# Patient Record
Sex: FEMALE | Race: WHITE
[De-identification: names, ages, dates, MRNs, and addresses within clinical notes are randomized per-mention and may not be internally consistent; named-entity substitution may affect disease eponyms.]

---

## 2019-06-29 ENCOUNTER — HOSPITAL ENCOUNTER (INPATIENT)
Dept: HOSPITAL 10 - TEL | Age: 64
LOS: 2 days | Discharge: HOME | DRG: 690 | End: 2019-07-01
Attending: INTERNAL MEDICINE | Admitting: INTERNAL MEDICINE
Payer: COMMERCIAL

## 2019-06-29 ENCOUNTER — HOSPITAL ENCOUNTER (INPATIENT)
Dept: HOSPITAL 91 - TEL | Age: 64
LOS: 2 days | Discharge: HOME | DRG: 690 | End: 2019-07-01
Payer: COMMERCIAL

## 2019-06-29 VITALS
BODY MASS INDEX: 30.89 KG/M2 | BODY MASS INDEX: 30.89 KG/M2 | HEIGHT: 65 IN | WEIGHT: 185.41 LBS | HEIGHT: 65 IN | WEIGHT: 185.41 LBS

## 2019-06-29 DIAGNOSIS — E66.9: ICD-10-CM

## 2019-06-29 DIAGNOSIS — F41.9: ICD-10-CM

## 2019-06-29 DIAGNOSIS — E11.9: ICD-10-CM

## 2019-06-29 DIAGNOSIS — Z90.49: ICD-10-CM

## 2019-06-29 DIAGNOSIS — E78.5: ICD-10-CM

## 2019-06-29 DIAGNOSIS — Z79.4: ICD-10-CM

## 2019-06-29 DIAGNOSIS — R22.1: ICD-10-CM

## 2019-06-29 DIAGNOSIS — Z79.82: ICD-10-CM

## 2019-06-29 DIAGNOSIS — G43.909: ICD-10-CM

## 2019-06-29 DIAGNOSIS — I10: ICD-10-CM

## 2019-06-29 DIAGNOSIS — N39.0: Primary | ICD-10-CM

## 2019-06-29 DIAGNOSIS — M79.7: ICD-10-CM

## 2019-06-29 PROCEDURE — 87086 URINE CULTURE/COLONY COUNT: CPT

## 2019-06-29 PROCEDURE — 76536 US EXAM OF HEAD AND NECK: CPT

## 2019-06-29 PROCEDURE — 82962 GLUCOSE BLOOD TEST: CPT

## 2019-06-29 PROCEDURE — 85025 COMPLETE CBC W/AUTO DIFF WBC: CPT

## 2019-06-29 PROCEDURE — 80048 BASIC METABOLIC PNL TOTAL CA: CPT

## 2019-06-29 PROCEDURE — 81003 URINALYSIS AUTO W/O SCOPE: CPT

## 2019-06-29 PROCEDURE — 83036 HEMOGLOBIN GLYCOSYLATED A1C: CPT

## 2019-06-29 PROCEDURE — A4310 INSERT TRAY W/O BAG/CATH: HCPCS

## 2019-06-30 VITALS — RESPIRATION RATE: 18 BRPM | DIASTOLIC BLOOD PRESSURE: 76 MMHG | SYSTOLIC BLOOD PRESSURE: 158 MMHG | HEART RATE: 80 BPM

## 2019-06-30 VITALS — HEART RATE: 72 BPM | RESPIRATION RATE: 18 BRPM | SYSTOLIC BLOOD PRESSURE: 125 MMHG | DIASTOLIC BLOOD PRESSURE: 62 MMHG

## 2019-06-30 VITALS — SYSTOLIC BLOOD PRESSURE: 120 MMHG | HEART RATE: 79 BPM | DIASTOLIC BLOOD PRESSURE: 68 MMHG | RESPIRATION RATE: 18 BRPM

## 2019-06-30 VITALS — SYSTOLIC BLOOD PRESSURE: 117 MMHG | HEART RATE: 69 BPM | RESPIRATION RATE: 18 BRPM | DIASTOLIC BLOOD PRESSURE: 65 MMHG

## 2019-06-30 VITALS — HEART RATE: 74 BPM | DIASTOLIC BLOOD PRESSURE: 60 MMHG | RESPIRATION RATE: 18 BRPM | SYSTOLIC BLOOD PRESSURE: 138 MMHG

## 2019-06-30 VITALS — HEART RATE: 74 BPM | DIASTOLIC BLOOD PRESSURE: 67 MMHG | RESPIRATION RATE: 18 BRPM | SYSTOLIC BLOOD PRESSURE: 127 MMHG

## 2019-06-30 VITALS — HEART RATE: 73 BPM | RESPIRATION RATE: 18 BRPM | DIASTOLIC BLOOD PRESSURE: 62 MMHG | SYSTOLIC BLOOD PRESSURE: 121 MMHG

## 2019-06-30 LAB
ADD MAN DIFF?: NO
ADD UMIC: NO
ANION GAP: 9 (ref 5–13)
BASOPHIL #: 0.1 10^3/UL (ref 0–0.1)
BASOPHILS %: 1.5 % (ref 0–2)
BLOOD UREA NITROGEN: 11 MG/DL (ref 7–20)
CALCIUM: 8.7 MG/DL (ref 8.4–10.2)
CARBON DIOXIDE: 25 MMOL/L (ref 21–31)
CHLORIDE: 109 MMOL/L (ref 97–110)
CREATININE: 0.51 MG/DL (ref 0.44–1)
EOSINOPHILS #: 0.3 10^3/UL (ref 0–0.5)
EOSINOPHILS %: 5.2 % (ref 0–7)
GLUCOSE: 118 MG/DL (ref 70–220)
HEMATOCRIT: 38.1 % (ref 37–47)
HEMOGLOBIN: 13.1 G/DL (ref 12–16)
IMMATURE GRANS #M: 0.02 10^3/UL (ref 0–0.03)
IMMATURE GRANS % (M): 0.3 % (ref 0–0.43)
LYMPHOCYTES #: 2.5 10^3/UL (ref 0.8–2.9)
LYMPHOCYTES %: 39.7 % (ref 15–51)
MEAN CORPUSCULAR HEMOGLOBIN: 30.3 PG (ref 29–33)
MEAN CORPUSCULAR HGB CONC: 34.4 G/DL (ref 32–37)
MEAN CORPUSCULAR VOLUME: 88.2 FL (ref 82–101)
MEAN PLATELET VOLUME: 11 FL (ref 7.4–10.4)
MONOCYTE #: 0.5 10^3/UL (ref 0.3–0.9)
MONOCYTES %: 7.8 % (ref 0–11)
NEUTROPHIL #: 2.8 10^3/UL (ref 1.6–7.5)
NEUTROPHILS %: 45.5 % (ref 39–77)
NUCLEATED RED BLOOD CELLS #: 0 10^3/UL (ref 0–0)
NUCLEATED RED BLOOD CELLS%: 0 /100WBC (ref 0–0)
PLATELET COUNT: 263 10^3/UL (ref 140–415)
POTASSIUM: 4.1 MMOL/L (ref 3.5–5.1)
RED BLOOD COUNT: 4.32 10^6/UL (ref 4.2–5.4)
RED CELL DISTRIBUTION WIDTH: 12.6 % (ref 11.5–14.5)
SODIUM: 143 MMOL/L (ref 135–144)
UR ASCORBIC ACID: NEGATIVE MG/DL
UR BILIRUBIN (DIP): NEGATIVE MG/DL
UR BLOOD (DIP): NEGATIVE MG/DL
UR CLARITY: CLEAR
UR COLOR: YELLOW
UR GLUCOSE (DIP): NEGATIVE MG/DL
UR KETONES (DIP): NEGATIVE MG/DL
UR LEUKOCYTE ESTERASE (DIP): NEGATIVE LEU/UL
UR NITRITE (DIP): NEGATIVE MG/DL
UR PH (DIP): 5 (ref 5–9)
UR SPECIFIC GRAVITY (DIP): 1.01 (ref 1–1.03)
UR TOTAL PROTEIN (DIP): NEGATIVE MG/DL
UR UROBILINOGEN (DIP): NEGATIVE MG/DL
WHITE BLOOD COUNT: 6.2 10^3/UL (ref 4.8–10.8)

## 2019-06-30 RX ADMIN — FOLIC ACID SCH MLS/HR: 5 INJECTION, SOLUTION INTRAMUSCULAR; INTRAVENOUS; SUBCUTANEOUS at 18:03

## 2019-06-30 RX ADMIN — ATORVASTATIN CALCIUM 1 MG: 40 TABLET, FILM COATED ORAL at 20:39

## 2019-06-30 RX ADMIN — THIAMINE HYDROCHLORIDE 1 MLS/HR: 100 INJECTION, SOLUTION INTRAMUSCULAR; INTRAVENOUS at 22:30

## 2019-06-30 RX ADMIN — ALPRAZOLAM 1 MG: 0.25 TABLET ORAL at 08:21

## 2019-06-30 RX ADMIN — ACETAMINOPHEN 1 MG: 325 TABLET, FILM COATED ORAL at 10:36

## 2019-06-30 RX ADMIN — FOLIC ACID SCH MLS/HR: 5 INJECTION, SOLUTION INTRAMUSCULAR; INTRAVENOUS; SUBCUTANEOUS at 02:50

## 2019-06-30 RX ADMIN — INSULIN ASPART 1 UNIT: 100 INJECTION, SOLUTION INTRAVENOUS; SUBCUTANEOUS at 12:23

## 2019-06-30 RX ADMIN — FOLIC ACID SCH MLS/HR: 5 INJECTION, SOLUTION INTRAMUSCULAR; INTRAVENOUS; SUBCUTANEOUS at 22:30

## 2019-06-30 RX ADMIN — ALPRAZOLAM 1 MG: 0.25 TABLET ORAL at 20:35

## 2019-06-30 RX ADMIN — CEFEPIME SCH MLS/HR: 1 INJECTION, POWDER, FOR SOLUTION INTRAMUSCULAR; INTRAVENOUS at 08:19

## 2019-06-30 RX ADMIN — LORATADINE SCH MG: 10 TABLET ORAL at 06:53

## 2019-06-30 RX ADMIN — LORATADINE 1 MG: 10 TABLET ORAL at 06:53

## 2019-06-30 RX ADMIN — INSULIN ASPART 1 UNIT: 100 INJECTION, SOLUTION INTRAVENOUS; SUBCUTANEOUS at 07:55

## 2019-06-30 RX ADMIN — ACETAMINOPHEN 1 MG: 325 TABLET, FILM COATED ORAL at 20:35

## 2019-06-30 RX ADMIN — CEFEPIME 1 MLS/HR: 1 INJECTION, POWDER, FOR SOLUTION INTRAMUSCULAR; INTRAVENOUS at 08:19

## 2019-06-30 RX ADMIN — AMLODIPINE BESYLATE 1 MG: 2.5 TABLET ORAL at 06:54

## 2019-06-30 RX ADMIN — THIAMINE HYDROCHLORIDE 1 MLS/HR: 100 INJECTION, SOLUTION INTRAMUSCULAR; INTRAVENOUS at 18:03

## 2019-06-30 RX ADMIN — CEFEPIME SCH MLS/HR: 1 INJECTION, POWDER, FOR SOLUTION INTRAMUSCULAR; INTRAVENOUS at 20:37

## 2019-06-30 RX ADMIN — LOSARTAN POTASSIUM 1 MG: 50 TABLET ORAL at 06:54

## 2019-06-30 RX ADMIN — INSULIN ASPART 1 UNIT: 100 INJECTION, SOLUTION INTRAVENOUS; SUBCUTANEOUS at 17:27

## 2019-06-30 RX ADMIN — CEFEPIME 1 MLS/HR: 1 INJECTION, POWDER, FOR SOLUTION INTRAMUSCULAR; INTRAVENOUS at 20:37

## 2019-06-30 RX ADMIN — ASPIRIN 1 MG: 81 TABLET, COATED ORAL at 08:19

## 2019-06-30 RX ADMIN — MELATONIN TAB 3 MG 1 MG: 3 TAB at 20:36

## 2019-06-30 RX ADMIN — ASPIRIN SCH MG: 81 TABLET, COATED ORAL at 08:19

## 2019-06-30 RX ADMIN — THIAMINE HYDROCHLORIDE 1 MLS/HR: 100 INJECTION, SOLUTION INTRAMUSCULAR; INTRAVENOUS at 02:50

## 2019-06-30 RX ADMIN — LOSARTAN POTASSIUM SCH MG: 50 TABLET, FILM COATED ORAL at 06:54

## 2019-06-30 NOTE — CONS
Assessment/Plan


Assessment/Plan


Hospital Course (Demo Recall)


64-year-old female presented to [facility] with the 


suprapubic pain going all the way to the rectum.  Patient was recently treated 


for urinary tract infection and last antibiotic was around June 18, 2019.  She 


has had recurrent urinary tract infection.  Patient was transferred to [facility] because of her insurance.  She did have a CT scan of the 


abdomen and pelvis at [facility] and that showed: Tiny 


amount of air within the bladder etiology is unclear lung bases demonstrate 


clear lung without any pleural effusion or pneumothorax.  The heart is 


unremarkable.  There is no pericardial effusion.  Study of the internal organs 


is limited due to lack of IV contrast.  Liver, spleen, adrenal glands, pancreas,


kidneys are all normal in appearance.  The patient is status post 


cholecystectomy.  There is no retroperitoneal or pelvic lymphadenopathy.  There 


is no free intra-abdominal air or fluid.  Pelvic viscera otherwise normal in 


appearance.  There is again sigmoid diverticulosis bowel gas pattern is 


otherwise unremarkable.  There is no appendicitis or diverticulitis.  There is 


no renal stone or ureteral stone.  There is again minimal vascular 


calcification.  Tiny amount of air is again noted within the bladder etiology is


unclear.


On the physical exam there is no abdominal mass palpable.  There is no flank 


tenderness.  She has mild suprapubic tenderness.  Pelvic exam did not show any 


mass or bleeding.


Impression: Patient may have urinary tract infection.  The air in the bladder 


could be because of the infection or from prior catheterization.  Plan: Do 


straight cath for urine culture and sensitivity and continue the antibiotics.





Consultation Date/Type/Reason


Admit Date/Time


Jun 29, 2019 at 23:47


Date of Consultation:  Jun 30, 2019


Type of Consult


Urology


Reason for Consultation


Suprapubic pain, air in the bladder on the CT scan


Requesting Provider:  EMELIA WRAY MD


Date/Time of Note


DATE: 6/30/19 


TIME: 20:12





Hx of Present Illness


64-year-old female presented to [facility] with the 


suprapubic pain going all the way to the rectum.  Patient was recently treated 


for urinary tract infection and last antibiotic was around June 18, 2019.  She 


has had recurrent urinary tract infection.  Patient was transferred to [facility] because of her insurance.  She did have a CT scan of the 


abdomen and pelvis at [facility] and that showed: Tiny 


amount of air within the bladder etiology is unclear lung bases demonstrate 


clear lung without any pleural effusion or pneumothorax.  The heart is 


unremarkable.  There is no pericardial effusion.  Study of the internal organs 


is limited due to lack of IV contrast.  Liver, spleen, adrenal glands, pancreas,


kidneys are all normal in appearance.  The patient is status post 


cholecystectomy.  There is no retroperitoneal or pelvic lymphadenopathy.  There 


is no free intra-abdominal air or fluid.  Pelvic viscera otherwise normal in 


appearance.  There is again sigmoid diverticulosis bowel gas pattern is 


otherwise unremarkable.  There is no appendicitis or diverticulitis.  There is 


no renal stone or ureteral stone.  There is again minimal vascular 


calcification.  Tiny amount of air is again noted within the bladder etiology is


unclear.


Constitutional:  chills (Prior to admission)


Eyes:  no complaints


ENT:  no complaints


Respiratory:  No shortness of breath


Cardiovascular:  No chest pain


Gastrointestinal:  nausea, vomiting (Phlegm)


Genitourinary:  dysuria; 


   No flank pain


Musculoskeletal:  no complaints


Skin:  no complaints


Neurologic:  no complaints, other (Migraine headache)


Endocrine:  no complaints


Lymphatic:  adenopathy (Left side of neck)


Psychological:  no complaints


Immunologic:  no complaints





Past Medical History


Medical History:  diabetes, high cholesterol, hypertension, other (Migraine 


headaches and fibromyalgia)


Home Meds


Reported Medications


Gabapentin* (Gabapentin*) 300 Mg Capsule, 300 MG PO DAILY, #60 CAP


   9/6/16


Atorvastatin* (Atorvastatin*) 40 Mg Tablet, 20 MG PO QHS, #30 TAB


   9/6/16


Dicyclomine Hcl* (Bentyl*) 20 Mg Tablet, 20 MG PO QID, TAB


   9/6/16


Calcium Carbonate-Vitamin D3 (Calcium 600 + D Softgel) 1 Each Capsule, 1 CAP PO 


DAILY, CAP


   9/6/16


Aspirin Ec (Aspir 81) 81 Mg Tablet.dr, 81 MG PO DAILY, #30 TAB


   9/6/16


Hydroxyzine Hcl (HYDROXYZINE HCL) 10 Mg/5 Ml Syrup, 25 MG PO Q6 PRN for 


ALLERGIES, #480 ML


   9/6/16


Alprazolam* (Alprazolam*) 0.25 Mg Tablet, 0.25 MG PO DAILY, TAB


   9/6/16


Metformin Hcl* (Metformin Hcl*) 500 Mg Tablet, 500 MG PO WITH BREAKFAST, #30 TAB


   9/6/16


Amlodipine Besylate* (Amlodipine Besylate*) 2.5 Mg Tablet, 5 MG PO DAILY, #30 


TAB


   9/6/16


Losartan Potassium* (Losartan Potassium*) 100 Mg Tablet, 100 MG PO DAILY, TAB


   9/6/16


Medications





Current Medications


Sodium Chloride 1,000 ml @  100 mls/hr Q10H IV  Last administered on 6/30/19at 


18:03; Admin Dose 100 MLS/HR;  Start 6/30/19 at 02:30


Acetaminophen (Tylenol Tab) 650 mg Q4H  PRN PO MILD PAIN(1-3)OR ELEVATED TEMP 


Last administered on 6/30/19at 10:36; Admin Dose 650 MG;  Start 6/30/19 at 02:30


Ondansetron HCl (Zofran Inj) 4 mg Q6H  PRN IV NAUSEA AND/OR VOMITING;  Start 


6/30/19 at 02:30


Cefepime HCl 50 ml @  100 mls/hr Q12 IVPB  Last administered on 6/30/19at 08:19;


Admin Dose 100 MLS/HR;  Start 6/30/19 at 09:00


Insulin Aspart (Novolog Insulin Pen) 4 unit WITH  MEALS SC ;  Start 6/30/19 at 


07:55


Diagnostic Test (Pha) (Accu-Chek) 1 ea AC MEALS AND  BEDTIME XX  Last 


administered on 6/30/19at 17:27; Admin Dose 1 EA;  Start 6/30/19 at 07:25


Miscellaneous Information 1 ea NOTE XX ;  Start 6/30/19 at 02:30


Glucose (Glutose) 15 gm Q15M  PRN PO DECREASED GLUCOSE;  Start 6/30/19 at 02:30


Glucose (Glutose) 22.5 gm Q15M  PRN PO DECREASED GLUCOSE;  Start 6/30/19 at 


02:30


Dextrose (D50w Syringe) 25 ml Q15M  PRN IV DECREASED GLUCOSE;  Start 6/30/19 at 


02:30


Dextrose (D50w Syringe) 50 ml Q15M  PRN IV DECREASED GLUCOSE;  Start 6/30/19 at 


02:30


Glucagon (Glucagen) 1 mg Q15M  PRN IM DECREASED GLUCOSE;  Start 6/30/19 at 02:30


Glucose (Glutose) 15 gm Q15M  PRN BUCCAL DECREASED GLUCOSE;  Start 6/30/19 at 


02:30


Amlodipine Besylate (Norvasc) 5 mg DAILY@0600 PO  Last administered on 6/30/19at


06:54; Admin Dose 5 MG;  Start 6/30/19 at 06:00


Aspirin (Halfprin) 81 mg DAILY PO  Last administered on 6/30/19at 08:19; Admin 


Dose 81 MG;  Start 6/30/19 at 09:00


Atorvastatin Calcium (Lipitor) 20 mg QHS PO ;  Start 6/30/19 at 21:00


Losartan Potassium (Cozaar) 100 mg DAILY@0600 PO  Last administered on 6/30/19at


06:54; Admin Dose 100 MG;  Start 6/30/19 at 06:00


Non-Formulary Medication 1 ea PRN  PRN BOTH EYES EYE IRRITATION;  Start 6/30/19 


at 02:30;  Status UNV


Loratadine (Claritin) 10 mg DAILY@0600 PO  Last administered on 6/30/19at 06:53;


Admin Dose 10 MG;  Start 6/30/19 at 06:00


Promethazine HCl/ Dextromethorphan (Phenergan-Dm) 5 ml Q4H  PRN PO COUGH;  Start


6/30/19 at 02:30


Meloxicam (Mobic) 7.5 mg PRN  PRN PO INFLAMATION;  Start 6/30/19 at 02:30


Non-Formulary Medication 1 ea BID TOP ;  Start 6/30/19 at 09:00


Miscellaneous Information 1 ea NOTE XX ;  Start 6/30/19 at 03:30


Alprazolam (Xanax) 0.25 mg QHS PO ;  Start 6/30/19 at 21:00


Melatonin (Melatonin) 3 mg HS PO ;  Start 6/30/19 at 21:00


Metformin HCl (Glucophage) 500 mg BID WITH  MEALS PO  Last administered on 


6/30/19at 17:31; Admin Dose 500 MG;  Start 6/30/19 at 17:55


Pantoprazole (Protonix Tab) 40 mg DAILY@06 PO ;  Start 7/1/19 at 06:00


Sumatriptan Succinate (Imitrex) 25 mg Q8  PRN PO HEADACHE;  Start 6/30/19 at 


12:30


Allergies:  


Coded Allergies:  


     shrimp (Verified  Allergy, Unknown, 6/30/19)





Past Surgical History


Past Surgical Hx:  cholecystectomy, other (Tummy tuck , bladder lift and she 


states they removed fibroid from her uterus.)





Social History


Alcohol Use:  none


Smoking Status:  Never smoker


Drug Use:  none





Exam/Review of Systems


Exam


Vitals





Vital Signs


  Date      Temp  Pulse  Resp  B/P (MAP)   Pulse Ox  O2          O2 Flow    FiO2


Time                                                 Delivery    Rate


   6/30/19  98.5     79    18      120/68        97


     20:00                           (85)


   6/30/19                                           Room Air


     16:38








Intake and Output





6/29/19 6/29/19 6/30/19





1515:00


23:00


07:00





IntakeIntake Total


300 ml





OutputOutput Total


800 ml





BalanceBalance


-500 ml











Constitutional:  alert


Psych:  no complaints


Head:  normocephalic


Eyes:  nl conjunctiva


ENMT:  nl external ears & nose


Neck:  supple, masses (Left side of neck)


Respiratory:  normal air movement; 


   No wheezing


Cardiovascular:  No jugular venous distention (JVD)


Gastrointestinal:  soft, surgical scars, other (Rectal exam no mass and no 


impaction)


Genitourinary - Female:  nl external genitalia, other (Pelvic exam: No mass and 


no discharge.)


Musculoskeletal:  nl extremities to inspection


Extremities:  No calf tenderness


Neurological:  nl mental status


Skin:  nl turgor





Results


Result Diagram:  


6/30/19 0601 6/30/19 0601





Results 24hrs





Laboratory Tests


Test
                 6/30/19
03:11  6/30/19
06:01  6/30/19
08:06  6/30/19
12:23


Urine Color           YELLOW


Urine Clarity         CLEAR


Urine pH                      5.0


Urine Specific              1.012


Gravity


Urine Ketones         NEGATIVE


Urine Nitrite         NEGATIVE


Urine Bilirubin       NEGATIVE


Urine Urobilinogen    NEGATIVE


Urine Leukocyte       NEGATIVE


Esterase


Urine Hemoglobin      NEGATIVE


Urine Glucose         NEGATIVE


Urine Total Protein   NEGATIVE


White Blood Count                            6.2


Red Blood Count                             4.32


Hemoglobin                                  13.1


Hematocrit                                  38.1


Mean Corpuscular                            88.2


Volume


Mean Corpuscular                            30.3


Hemoglobin


Mean Corpuscular      
                    34.4  
  
              



Hemoglobin
Concent


Red Cell                                    12.6


Distribution Width


Platelet Count                               263


Mean Platelet Volume                       11.0  H


Immature                                   0.300


Granulocytes %


Neutrophils %                               45.5


Lymphocytes %                               39.7


Monocytes %                                  7.8


Eosinophils %                                5.2


Basophils %                                  1.5


Nucleated Red Blood                          0.0


Cells %


Immature                                   0.020


Granulocytes #


Neutrophils #                                2.8


Lymphocytes #                                2.5


Monocytes #                                  0.5


Eosinophils #                                0.3


Basophils #                                  0.1


Nucleated Red Blood                          0.0


Cells #


Sodium Level                                 143


Potassium Level                              4.1


Chloride Level                               109


Carbon Dioxide Level                          25


Anion Gap                                      9


Blood Urea Nitrogen                           11


Creatinine                                  0.51


Est Glomerular        
              > 60  
        
              



Filtrat Rate
mL/min


Glucose Level                                118


Calcium Level                                8.7


Bedside Glucose                                             135            150


Test
                 6/30/19
17:26  
              
              



Bedside Glucose               148








Imaging


Imaging


CT scan of the abdomen and pelvis:


lung bases demonstrate clear lung without any pleural effusion or pneumothorax. 


The heart is unremarkable.  There is no pericardial effusion.  Study of the 


internal organs is limited due to lack of IV contrast.  Liver, spleen, adrenal 


glands, pancreas, kidneys are all normal in appearance.  The patient is status 


post cholecystectomy.  There is no retroperitoneal or pelvic lymphadenopathy.  


There is no free intra-abdominal air or fluid.  Pelvic viscera otherwise normal 


in appearance.  There is again sigmoid diverticulosis bowel gas pattern is 


otherwise unremarkable.  There is no appendicitis or diverticulitis.  There is 


no renal stone or ureteral stone.  There is again minimal vascular 


calcification.  Tiny amount of air is again noted within the bladder etiology is


unclear.





Medications


Medication





Current Medications


Sodium Chloride 1,000 ml @  100 mls/hr Q10H IV  Last administered on 6/30/19at 


18:03; Admin Dose 100 MLS/HR;  Start 6/30/19 at 02:30


Acetaminophen (Tylenol Tab) 650 mg Q4H  PRN PO MILD PAIN(1-3)OR ELEVATED TEMP 


Last administered on 6/30/19at 10:36; Admin Dose 650 MG;  Start 6/30/19 at 02:30


Ondansetron HCl (Zofran Inj) 4 mg Q6H  PRN IV NAUSEA AND/OR VOMITING;  Start 


6/30/19 at 02:30


Cefepime HCl 50 ml @  100 mls/hr Q12 IVPB  Last administered on 6/30/19at 08:19;


Admin Dose 100 MLS/HR;  Start 6/30/19 at 09:00


Insulin Aspart (Novolog Insulin Pen) 4 unit WITH  MEALS SC ;  Start 6/30/19 at 


07:55


Diagnostic Test (Pha) (Accu-Chek) 1 ea AC MEALS AND  BEDTIME XX  Last 


administered on 6/30/19at 17:27; Admin Dose 1 EA;  Start 6/30/19 at 07:25


Miscellaneous Information 1 ea NOTE XX ;  Start 6/30/19 at 02:30


Glucose (Glutose) 15 gm Q15M  PRN PO DECREASED GLUCOSE;  Start 6/30/19 at 02:30


Glucose (Glutose) 22.5 gm Q15M  PRN PO DECREASED GLUCOSE;  Start 6/30/19 at 


02:30


Dextrose (D50w Syringe) 25 ml Q15M  PRN IV DECREASED GLUCOSE;  Start 6/30/19 at 


02:30


Dextrose (D50w Syringe) 50 ml Q15M  PRN IV DECREASED GLUCOSE;  Start 6/30/19 at 


02:30


Glucagon (Glucagen) 1 mg Q15M  PRN IM DECREASED GLUCOSE;  Start 6/30/19 at 02:30


Glucose (Glutose) 15 gm Q15M  PRN BUCCAL DECREASED GLUCOSE;  Start 6/30/19 at 


02:30


Amlodipine Besylate (Norvasc) 5 mg DAILY@0600 PO  Last administered on 6/30/19at


06:54; Admin Dose 5 MG;  Start 6/30/19 at 06:00


Aspirin (Halfprin) 81 mg DAILY PO  Last administered on 6/30/19at 08:19; Admin 


Dose 81 MG;  Start 6/30/19 at 09:00


Atorvastatin Calcium (Lipitor) 20 mg QHS PO ;  Start 6/30/19 at 21:00


Losartan Potassium (Cozaar) 100 mg DAILY@0600 PO  Last administered on 6/30/19at


06:54; Admin Dose 100 MG;  Start 6/30/19 at 06:00


Non-Formulary Medication 1 ea PRN  PRN BOTH EYES EYE IRRITATION;  Start 6/30/19 


at 02:30;  Status UNV


Loratadine (Claritin) 10 mg DAILY@0600 PO  Last administered on 6/30/19at 06:53;


Admin Dose 10 MG;  Start 6/30/19 at 06:00


Promethazine HCl/ Dextromethorphan (Phenergan-Dm) 5 ml Q4H  PRN PO COUGH;  Start


6/30/19 at 02:30


Meloxicam (Mobic) 7.5 mg PRN  PRN PO INFLAMATION;  Start 6/30/19 at 02:30


Non-Formulary Medication 1 ea BID TOP ;  Start 6/30/19 at 09:00


Miscellaneous Information 1 ea NOTE XX ;  Start 6/30/19 at 03:30


Alprazolam (Xanax) 0.25 mg QHS PO ;  Start 6/30/19 at 21:00


Melatonin (Melatonin) 3 mg HS PO ;  Start 6/30/19 at 21:00


Metformin HCl (Glucophage) 500 mg BID WITH  MEALS PO  Last administered on 


6/30/19at 17:31; Admin Dose 500 MG;  Start 6/30/19 at 17:55


Pantoprazole (Protonix Tab) 40 mg DAILY@06 PO ;  Start 7/1/19 at 06:00


Sumatriptan Succinate (Imitrex) 25 mg Q8  PRN PO HEADACHE;  Start 6/30/19 at 


12:30











YANE GERMAIN MD            Jun 30, 2019 20:23

## 2019-06-30 NOTE — HP
YING WORTHY 6/30/19 1151:


Date/Time of Note


Date/Time of Note


DATE: 6/30/19 


TIME: 11:36





Assessment/Plan


VTE Prophylaxis


SCD contraindicated:  low risk/ambulating


Pharmacological prophylaxis:  NA/contraindicated


Pharm contraindication:  surgical contra





Lines/Catheters


IV Catheter Type (from Nrsg):  Saline Lock


Urinary Cath still in place:  No





Assessment/Plan


Hospital Course


1. SIRS,  UTI with positive leukocyte esterase seen on records from [facility].  Dysuria. Gas is seen on CT scan without contrast in bladder.  


hx of  of UTI 3 weeks ago


2. Left neck lymphnode/mass started 10 days ago


3.  Diabetes mellitus type 2


4.  History of diverticulosis


5.  Obesity


6.  Hyper lipidemia


7.  Anxiety 


8, status post cholecystectomy


9.  Allergies


10.  Hypertension


11.  fibromyalgia


12. Insomnia


13.  Migraine headache


Assessment/Plan


-DVT prophylaxis ambulation


- neck 


-hypoglycemic control with Metformin, he Creatinine is normal


GI prophylaxis Protonix


-c/w home meds


-pain medication 


continue with home medications 


continue with IV antibiotics 


-dr Huang urology consult called


Result Diagram:  


6/30/19 0601                                                                    


           6/30/19 0601





Results 24hrs





Laboratory Tests


Test
                                6/30/19
03:11  6/30/19
06:01  6/30/19
08:06


Urine Color                          YELLOW


Urine Clarity                        CLEAR


Urine pH                                     5.0


Urine Specific Gravity                     1.012


Urine Ketones                        NEGATIVE


Urine Nitrite                        NEGATIVE


Urine Bilirubin                      NEGATIVE


Urine Urobilinogen                   NEGATIVE


Urine Leukocyte Esterase             NEGATIVE


Urine Hemoglobin                     NEGATIVE


Urine Glucose                        NEGATIVE


Urine Total Protein                  NEGATIVE


White Blood Count                                           6.2


Red Blood Count                                            4.32


Hemoglobin                                                 13.1


Hematocrit                                                 38.1


Mean Corpuscular Volume                                    88.2


Mean Corpuscular Hemoglobin                                30.3


Mean Corpuscular Hemoglobin
Concent  
                    34.4  
  



Red Cell Distribution Width                                12.6


Platelet Count                                              263


Mean Platelet Volume                                      11.0  H


Immature Granulocytes %                                   0.300


Neutrophils %                                              45.5


Lymphocytes %                                              39.7


Monocytes %                                                 7.8


Eosinophils %                                               5.2


Basophils %                                                 1.5


Nucleated Red Blood Cells %                                 0.0


Immature Granulocytes #                                   0.020


Neutrophils #                                               2.8


Lymphocytes #                                               2.5


Monocytes #                                                 0.5


Eosinophils #                                               0.3


Basophils #                                                 0.1


Nucleated Red Blood Cells #                                 0.0


Sodium Level                                                143


Potassium Level                                             4.1


Chloride Level                                              109


Carbon Dioxide Level                                         25


Anion Gap                                                     9


Blood Urea Nitrogen                                          11


Creatinine                                                 0.51


Est Glomerular Filtrat Rate
mL/min   
              > 60  
        



Glucose Level                                               118


Calcium Level                                               8.7


Bedside Glucose                                                            135








HPI/ROS


Admit Date/Time


Admit Date/Time


Jun 29, 2019 at 23:47





Hx of Present Illness


This is a 64-year-old female with medical history of obesity,  hypertension,  


hyperlipidemia,  diabetes mellitus type II was seen in [facility] emergency center on 6/29/2019 for the evaluation of dysuria and pelvic 


pain for 1 day.  Medical records from the emergency room reviewed and it says 


that patient reported pain 7 out of 10 with radiation and left lower quadrant in


the abdomen, flank pain she denies any alleviating factors.  Patient reported 


that she has a urinary tract infection 3 weeks ago and she did not used 


antibiotics.  She has a problem obtaining an appointment with her primary PCP.  


Her symptoms will somehow resolved and recently she developed the symptoms 


again.  She did report nausea.  Patient denies fever or chills.  Patient denied 


shortness of breath.  Upon admission to emergency room in [facility] 


patient has a vital signs temperature 36.6 C.  Respiration 18.  Blood pressure 


133/63.  Duration 95% on room air.  Pulse 81.  Urinalysis with microscopic 


studies demonstrated: Yellow clarity cloudy pH 5.0 specific gravity gravity 


1.14, blood positive, glucose ketones bilirubin and nitrite negative.  Leukocyte


esterase positive WBC more than 50 RBC 11-20.  WBC 12.4 RBC 4.65 hemoglobin 14.3


hematocrit 42.4.  Percent of neutrophils 75.  CMP shows sodium 141 potassium 4.3


chloride 105 CO2 23 anion gap 13 glucose 137 BUN 17 creatinine 0.61.  Alkaline 


phosphatase 122 ALT 20 AST 14 bilirubin 0.7.  Protein is negative CT scan of 


abdomen and pelvis without IV contrast showed lungs are clear heart is 


unremarkable there is no pericardial effusion.  Liver spleen adrenal glands 


pancreas kidney are normal patient status post cholecystectomy there is no 


retroperitoneal or pelvic lymphadenopathy there is a sigmoid diverticulosis 


minimal vascular calcification.  Tiny amount of free air is seen in the bladder 


etiology is unclear.  EKG sinus rhythm.  Ultrasound of pelvis with transvaginal 


mode normal pelvic ultrasound.  Patient was given an emergency room Rocephin 1 


g.  Sodium chloride bolus was given 1 L morphine was given 2 mg Zofran 4 mg was 


given famotidine 20 mg injection was given as well.


Home medication:


 amlodipine 5 mg 


aspirin 81 mg p.o. daily 


atorvastatin 20 mg p.o. daily.  


Famotidine 20 mg p.o. daily 


cromolyn 4% ophthalmic ophthalmic solution daily 


Loratadine 10 mg p.o. daily 


losartan 100 mg p.o. daily 


meloxicam 7.5 mg p.o. daily as needed 


Metformin 500 mg p.o. twice daily 


Percocet 5/3/2025 every 6 hours as needed 


triamcinolone nasal spray daily


Surgical history: Cholecystectomy


Social history: Single. does not smoke drink or use drugs.





ROS


Gastrointestinal:  nausea


Genitourinary:  no complaints, bleeding, dysuria, discharge, flank pain, 


hematuria, other


Lymphatic:  no complaints, adenopathy, tender nodes, lymphadema, other





PMH/Family/Social


Past Medical History


Medical History:  diabetes, high cholesterol, hypertension


Medications





Current Medications


Sodium Chloride 1,000 ml @  100 mls/hr Q10H IV  Last administered on 6/30/19at 


02:50; Admin Dose 100 MLS/HR;  Start 6/30/19 at 02:30


Acetaminophen (Tylenol Tab) 650 mg Q4H  PRN PO MILD PAIN(1-3)OR ELEVATED TEMP 


Last administered on 6/30/19at 10:36; Admin Dose 650 MG;  Start 6/30/19 at 02:30


Ondansetron HCl (Zofran Inj) 4 mg Q6H  PRN IV NAUSEA AND/OR VOMITING;  Start 


6/30/19 at 02:30


Cefepime HCl 50 ml @  100 mls/hr Q12 IVPB  Last administered on 6/30/19at 08:19;


Admin Dose 100 MLS/HR;  Start 6/30/19 at 09:00


Insulin Aspart (Novolog Insulin Pen) 4 unit WITH  MEALS SC ;  Start 6/30/19 at 


07:55


Diagnostic Test (Pha) (Accu-Chek) 1 ea AC MEALS AND  BEDTIME XX  Last 


administered on 6/30/19at 08:18; Admin Dose 1 EA;  Start 6/30/19 at 07:25


Miscellaneous Information 1 ea NOTE XX ;  Start 6/30/19 at 02:30


Glucose (Glutose) 15 gm Q15M  PRN PO DECREASED GLUCOSE;  Start 6/30/19 at 02:30


Glucose (Glutose) 22.5 gm Q15M  PRN PO DECREASED GLUCOSE;  Start 6/30/19 at 


02:30


Dextrose (D50w Syringe) 25 ml Q15M  PRN IV DECREASED GLUCOSE;  Start 6/30/19 at 


02:30


Dextrose (D50w Syringe) 50 ml Q15M  PRN IV DECREASED GLUCOSE;  Start 6/30/19 at 


02:30


Glucagon (Glucagen) 1 mg Q15M  PRN IM DECREASED GLUCOSE;  Start 6/30/19 at 02:30


Glucose (Glutose) 15 gm Q15M  PRN BUCCAL DECREASED GLUCOSE;  Start 6/30/19 at 


02:30


Alprazolam (Xanax) 0.25 mg DAILY PO ;  Start 6/30/19 at 09:00


Amlodipine Besylate (Norvasc) 5 mg DAILY@0600 PO  Last administered on 6/30/19at


06:54; Admin Dose 5 MG;  Start 6/30/19 at 06:00


Aspirin (Halfprin) 81 mg DAILY PO  Last administered on 6/30/19at 08:19; Admin 


Dose 81 MG;  Start 6/30/19 at 09:00


Atorvastatin Calcium (Lipitor) 20 mg QHS PO ;  Start 6/30/19 at 21:00


Losartan Potassium (Cozaar) 100 mg DAILY@0600 PO  Last administered on 6/30/19at


06:54; Admin Dose 100 MG;  Start 6/30/19 at 06:00


Non-Formulary Medication 1 ea PRN  PRN BOTH EYES EYE IRRITATION;  Start 6/30/19 


at 02:30;  Status UNV


Loratadine (Claritin) 10 mg DAILY@0600 PO  Last administered on 6/30/19at 06:53;


Admin Dose 10 MG;  Start 6/30/19 at 06:00


Promethazine HCl/ Dextromethorphan (Phenergan-Dm) 5 ml Q4H  PRN PO COUGH;  Start


6/30/19 at 02:30


Meloxicam (Mobic) 7.5 mg PRN  PRN PO INFLAMATION;  Start 6/30/19 at 02:30


Non-Formulary Medication 1 ea BID TOP ;  Start 6/30/19 at 09:00


Miscellaneous Information 1 ea NOTE XX ;  Start 6/30/19 at 03:30


Coded Allergies:  


     No Known Drug Allergies (Verified  Allergy, Unknown, 10/15/12)


     shrimp (Verified  Allergy, Unknown, 6/30/19)





Past Surgical History


Past Surgical Hx:  cholecystectomy





Social History


Alcohol Use:  none


Smoking Status:  Never smoker


Drug Use:  none





Exam/Review of Systems


Vital Signs


Vitals





Vital Signs


  Date      Temp  Pulse  Resp  B/P (MAP)   Pulse Ox  O2          O2 Flow    FiO2


Time                                                 Delivery    Rate


   6/30/19  98.6     69    18      117/65        98  Room Air


     11:30                           (82)








Intake and Output





6/29/19 6/29/19 6/30/19





1515:00


23:00


07:00





IntakeIntake Total


300 ml





OutputOutput Total


800 ml





BalanceBalance


-500 ml














Exam


Exam


left above clavicular mass/lymphadenopathy


Constitutional:  alert, oriented


Psych:  anxiety, other (insomnia)


ENMT:  nl external ears & nose


Neck:  supple, masses (left side above clavicle), other


Respiratory:  clear to auscultation


Cardiovascular:  regular rate and rhythm


Gastrointestinal:  soft, rebound or guarding (lower abdomen)


Genitourinary - Female:  CVA tenderness; 


   No nl adnexae, No nl external genitalia, No CMT, No uterus, No other


Musculoskeletal:  nl extremities to inspection


Extremities:  edema; 


   No normal pulses, No calf tenderness, No cyanosis, No clubbing, No pitting 


pedal edema, No palpable cord, No tenderness, No other





EMELIA WRAY MD 6/30/19 1521:


Assessment/Plan


SEEN AND EXAMINE


UTI RECUUREMT> AIR IN BLADDER


IV ABX


Result Diagram:  


6/30/19 0601 6/30/19 0601








PMH/Family/Social


Past Medical History


Coded Allergies:  


     No Known Drug Allergies (Verified  Allergy, Unknown, 10/15/12)


     shrimp (Verified  Allergy, Unknown, 6/30/19)











YING WORTHY                Jun 30, 2019 11:51


EMELIA WRAY MD              Jun 30, 2019 15:21

## 2019-07-01 VITALS — DIASTOLIC BLOOD PRESSURE: 57 MMHG | RESPIRATION RATE: 18 BRPM | HEART RATE: 78 BPM | SYSTOLIC BLOOD PRESSURE: 113 MMHG

## 2019-07-01 VITALS — HEART RATE: 65 BPM | RESPIRATION RATE: 20 BRPM | SYSTOLIC BLOOD PRESSURE: 113 MMHG | DIASTOLIC BLOOD PRESSURE: 67 MMHG

## 2019-07-01 VITALS — DIASTOLIC BLOOD PRESSURE: 64 MMHG | HEART RATE: 77 BPM | RESPIRATION RATE: 20 BRPM | SYSTOLIC BLOOD PRESSURE: 110 MMHG

## 2019-07-01 VITALS — DIASTOLIC BLOOD PRESSURE: 56 MMHG | HEART RATE: 72 BPM | SYSTOLIC BLOOD PRESSURE: 108 MMHG

## 2019-07-01 LAB
ADD MAN DIFF?: NO
ANION GAP: 8 (ref 5–13)
BASOPHIL #: 0.1 10^3/UL (ref 0–0.1)
BASOPHILS %: 0.9 % (ref 0–2)
BLOOD UREA NITROGEN: 12 MG/DL (ref 7–20)
CALCIUM: 8.9 MG/DL (ref 8.4–10.2)
CARBON DIOXIDE: 23 MMOL/L (ref 21–31)
CHLORIDE: 113 MMOL/L (ref 97–110)
CREATININE: 0.49 MG/DL (ref 0.44–1)
EOSINOPHILS #: 0.4 10^3/UL (ref 0–0.5)
EOSINOPHILS %: 6 % (ref 0–7)
GLUCOSE: 137 MG/DL (ref 70–220)
HEMATOCRIT: 38.9 % (ref 37–47)
HEMOGLOBIN A1C: 6.1 % (ref 0–5.9)
HEMOGLOBIN: 13.4 G/DL (ref 12–16)
IMMATURE GRANS #M: 0.02 10^3/UL (ref 0–0.03)
IMMATURE GRANS % (M): 0.3 % (ref 0–0.43)
LYMPHOCYTES #: 2.3 10^3/UL (ref 0.8–2.9)
LYMPHOCYTES %: 35.9 % (ref 15–51)
MEAN CORPUSCULAR HEMOGLOBIN: 30.5 PG (ref 29–33)
MEAN CORPUSCULAR HGB CONC: 34.4 G/DL (ref 32–37)
MEAN CORPUSCULAR VOLUME: 88.6 FL (ref 82–101)
MEAN PLATELET VOLUME: 11 FL (ref 7.4–10.4)
MONOCYTE #: 0.5 10^3/UL (ref 0.3–0.9)
MONOCYTES %: 8.3 % (ref 0–11)
NEUTROPHIL #: 3.1 10^3/UL (ref 1.6–7.5)
NEUTROPHILS %: 48.6 % (ref 39–77)
NUCLEATED RED BLOOD CELLS #: 0 10^3/UL (ref 0–0)
NUCLEATED RED BLOOD CELLS%: 0 /100WBC (ref 0–0)
PLATELET COUNT: 270 10^3/UL (ref 140–415)
POTASSIUM: 4.1 MMOL/L (ref 3.5–5.1)
RED BLOOD COUNT: 4.39 10^6/UL (ref 4.2–5.4)
RED CELL DISTRIBUTION WIDTH: 12.5 % (ref 11.5–14.5)
SODIUM: 144 MMOL/L (ref 135–144)
WHITE BLOOD COUNT: 6.4 10^3/UL (ref 4.8–10.8)

## 2019-07-01 RX ADMIN — LORATADINE SCH MG: 10 TABLET ORAL at 05:46

## 2019-07-01 RX ADMIN — INSULIN ASPART 1 UNIT: 100 INJECTION, SOLUTION INTRAVENOUS; SUBCUTANEOUS at 12:00

## 2019-07-01 RX ADMIN — LORATADINE 1 MG: 10 TABLET ORAL at 05:46

## 2019-07-01 RX ADMIN — THIAMINE HYDROCHLORIDE 1 MLS/HR: 100 INJECTION, SOLUTION INTRAMUSCULAR; INTRAVENOUS at 08:12

## 2019-07-01 RX ADMIN — THIAMINE HYDROCHLORIDE 1 MLS/HR: 100 INJECTION, SOLUTION INTRAMUSCULAR; INTRAVENOUS at 03:17

## 2019-07-01 RX ADMIN — FOLIC ACID SCH MLS/HR: 5 INJECTION, SOLUTION INTRAMUSCULAR; INTRAVENOUS; SUBCUTANEOUS at 08:12

## 2019-07-01 RX ADMIN — LOSARTAN POTASSIUM 1 MG: 50 TABLET ORAL at 05:47

## 2019-07-01 RX ADMIN — PANTOPRAZOLE SODIUM 1 MG: 40 TABLET, DELAYED RELEASE ORAL at 05:46

## 2019-07-01 RX ADMIN — ASPIRIN SCH MG: 81 TABLET, COATED ORAL at 08:12

## 2019-07-01 RX ADMIN — CEFEPIME 1 MLS/HR: 1 INJECTION, POWDER, FOR SOLUTION INTRAMUSCULAR; INTRAVENOUS at 08:11

## 2019-07-01 RX ADMIN — ASPIRIN 1 MG: 81 TABLET, COATED ORAL at 08:12

## 2019-07-01 RX ADMIN — INSULIN ASPART 1 UNIT: 100 INJECTION, SOLUTION INTRAVENOUS; SUBCUTANEOUS at 08:00

## 2019-07-01 RX ADMIN — LOSARTAN POTASSIUM SCH MG: 50 TABLET, FILM COATED ORAL at 05:47

## 2019-07-01 RX ADMIN — CEFEPIME SCH MLS/HR: 1 INJECTION, POWDER, FOR SOLUTION INTRAMUSCULAR; INTRAVENOUS at 08:11

## 2019-07-01 RX ADMIN — FOLIC ACID SCH MLS/HR: 5 INJECTION, SOLUTION INTRAMUSCULAR; INTRAVENOUS; SUBCUTANEOUS at 03:17

## 2019-07-01 NOTE — PDOCDIS
Discharge Instructions


DIAGNOSIS


Discharge Diagnosis


UTI





CONDITION


                 [id number]
Patient Condition:  [id number]
Good








HOME CARE INSTRUCTIONS:


                  [id number]
Special Diet:  [id number]
diabetic








ACTIVITY:


     [id number]
Activity Restrictions:  [id number]
Slowly Increase Activity


                                        Rest between Activity








FOLLOW UP/APPOINTMENTS


Follow-up Plan


fu PCP IN 2-3 DAYS


[facility] Urology in 2-3 weeks


fu US neck with PCP











EMELIA WRAY MD               Jul 1, 2019 12:40

## 2019-07-02 NOTE — DS
DATE OF ADMISSION: 06/29/2019

DATE OF DISCHARGE: 07/01/2019

 

HISTORY OF PRESENT ILLNESS AND HOSPITAL COURSE:  This is a 64-year-old female with past medical histo
ry of obesity, hypertension, hyperlipidemia and diabetes, who was seen at [facility] o
n 06/29 secondary to evaluation of dysuria and pelvic pain for 1 day.  Medical records there showed t
hat patient was having some pain in suprapubic region.  She did have UTI 3 weeks ago.  She did not us
e antibiotics, problem obtaining appointment with PCP.  She also had some nausea.  She denied any kathia
rtness of breath.  There, the patient had temperature 36.6, blood pressure was stable.  Urinalysis sh
ows positive blood, leukocyte more than 50, RBCs 11-20, white count was 12.4. , BUN of 17, creatinine
 0.61.  The patient had a CT of the abdomen and pelvis without IV contrast, which shows heart and kassy
g, unremarkable.  Ultrasound of liver, spleen, adrenal glands and kidneys were normal and no retroper
itoneal or pelvic lymphadenopathy.  Sigmoid diverticulosis.  There was some amount of tiny free air s
een in the bladder of unclear etiology.  The patient was given Rocephin, NS bolus and was transferred
 due to insurance reasons.  The patient was treated with IV cefepime here in the hospitalization stay
.  White count came down to 6.2.  BMP within normal limits.  UA was done here, repeat which was negat
jeovanny.  Urine cultures were done, negative.  The patient was seen by Dr. Huang.  According to him, th
e patient may have a UTI, air in the bladder could be because of infection or from prior catheterizat
ion, do straight cath for urine culture sensitivity and continue the antibiotics.  The patient was fe
eling much better.  Also had a soft tissue ultrasound of the left neck, which shows a 4.2 x 2.4 x 3.1
 cm anechoic/hypoechoic lesion, exclude cyst abscess, seroma or hematoma.  The patient was explained 
about this and will follow up with PCP.

 

FINAL DISCHARGE DIAGNOSES:

1.  Dysuria, pelvic pain, likely secondary to urinary tract infection. 

2.  Left neck swelling with ultrasound showing a cyst abscess, seroma or hematoma.  Follow up with [facility]
P as an outpatient.

3.  Diabetes.

4.  Hypertension.

5.  History of diverticulosis.

6.  Obesity.

7.  Hyperlipidemia.

8.  Anxiety.

9.  Status post cholecystectomy.

10.  Hypertension.

11.  Fibromyalgia.

12.  Migraine headache.

 

DISCHARGE CONDITION:  Stable.

 

DISCHARGE MEDICATIONS:  Continue with home medications, which were:

1.  ____.

2.  Norvasc 5.

3.  Aspirin 81.

4.  Atorvastatin 20.

5.  Calcium with vitamin D.

6.  Dicyclomine 20 q.i.d.

7.  Gabapentin 300 daily.

8.  Hydroxyzine.

9.  Losartan 100.

10.  Metformin 500 b.i.d.

11.  Cipro 250 mg p.o. b.i.d. for 3 days.

 

The patient was instructed to return to the ER if she has severe symptoms.  Again, the patient is sup
posed to follow up with the PCP tomorrow.

 

 

Dictated By: EMELIA DEWITT/LAN

DD:    07/01/2019 12:49:43

DT:    07/01/2019 23:53:31

Conf#: [id number]

DID#:  [id number]